# Patient Record
Sex: MALE | Race: WHITE | NOT HISPANIC OR LATINO | URBAN - METROPOLITAN AREA
[De-identification: names, ages, dates, MRNs, and addresses within clinical notes are randomized per-mention and may not be internally consistent; named-entity substitution may affect disease eponyms.]

---

## 2019-12-26 ENCOUNTER — OFFICE VISIT (OUTPATIENT)
Dept: URGENT CARE | Facility: CLINIC | Age: 29
End: 2019-12-26
Payer: COMMERCIAL

## 2019-12-26 VITALS
SYSTOLIC BLOOD PRESSURE: 124 MMHG | TEMPERATURE: 99 F | OXYGEN SATURATION: 96 % | RESPIRATION RATE: 12 BRPM | WEIGHT: 193 LBS | HEART RATE: 105 BPM | DIASTOLIC BLOOD PRESSURE: 71 MMHG

## 2019-12-26 DIAGNOSIS — H10.32 ACUTE BACTERIAL CONJUNCTIVITIS OF LEFT EYE: Primary | ICD-10-CM

## 2019-12-26 PROCEDURE — 99204 OFFICE O/P NEW MOD 45 MIN: CPT | Mod: S$GLB,,, | Performed by: NURSE PRACTITIONER

## 2019-12-26 PROCEDURE — 99204 PR OFFICE/OUTPT VISIT, NEW, LEVL IV, 45-59 MIN: ICD-10-PCS | Mod: S$GLB,,, | Performed by: NURSE PRACTITIONER

## 2019-12-26 RX ORDER — OFLOXACIN 3 MG/ML
1 SOLUTION/ DROPS OPHTHALMIC 4 TIMES DAILY
Qty: 10 ML | Refills: 0 | Status: SHIPPED | OUTPATIENT
Start: 2019-12-26

## 2019-12-26 RX ORDER — CETIRIZINE HYDROCHLORIDE 10 MG/1
10 TABLET ORAL DAILY
Qty: 90 TABLET | Refills: 0 | Status: SHIPPED | OUTPATIENT
Start: 2019-12-26

## 2019-12-26 NOTE — PROGRESS NOTES
Subjective:       Patient ID: Leroy Marquez is a 29 y.o. male.    Vitals:  weight is 87.5 kg (193 lb). His temperature is 98.7 °F (37.1 °C). His blood pressure is 124/71 and his pulse is 105. His respiration is 12 and oxygen saturation is 96%.     Chief Complaint: Eye Problem    Eye Problem    The left eye is affected. This is a new problem. Episode onset: x 4 days  The problem occurs constantly. The pain is at a severity of 0/10. The patient is experiencing no pain. He does not wear contacts. Associated symptoms include an eye discharge, eye redness and itching. Pertinent negatives include no blurred vision (only when draining), double vision, fever or photophobia. Associated symptoms comments: Left eye redness, crusted drainage, on/off pain and itchiness, light sensitivity . He has tried eye drops (OTC eye drops ) for the symptoms. The treatment provided no relief.       Constitution: Negative for chills, fatigue and fever.   HENT: Negative.    Eyes: Positive for eye discharge, eye itching, eye pain and eye redness. Negative for eye trauma, foreign body in eye, photophobia, vision loss, double vision, blurred vision (only when draining) and eyelid swelling.   Respiratory: Negative.    Neurological: Negative for dizziness, light-headedness and headaches.       Objective:      Physical Exam   Constitutional: He is oriented to person, place, and time. He appears well-developed and well-nourished.   HENT:   Head: Normocephalic and atraumatic.   Eyes: Pupils are equal, round, and reactive to light. EOM are normal. Right eye exhibits no discharge. Left eye exhibits discharge and exudate (ava colored). Left eye exhibits no hordeolum. No foreign body present in the left eye. No scleral icterus.   Redness noted to sclera of left eye and conjunctivae of left eye   Neurological: He is alert and oriented to person, place, and time.   Skin: Skin is warm and dry. Capillary refill takes less than 2 seconds.   Psychiatric:  He has a normal mood and affect. His behavior is normal. Thought content normal.         Assessment:       1. Acute bacterial conjunctivitis of left eye        Plan:         Acute bacterial conjunctivitis of left eye  -     ofloxacin (OCUFLOX) 0.3 % ophthalmic solution; Place 1 drop into the left eye 4 (four) times daily.  Dispense: 10 mL; Refill: 0  -     cetirizine (ZYRTEC) 10 MG tablet; Take 1 tablet (10 mg total) by mouth once daily.  Dispense: 90 tablet; Refill: 0    If no relief within first 48 hours follow up with ophthalmologist or PCP

## 2020-05-28 ENCOUNTER — OCCUPATIONAL HEALTH (OUTPATIENT)
Dept: URGENT CARE | Facility: CLINIC | Age: 30
End: 2020-05-28

## 2020-05-28 PROCEDURE — 80305 PR DRUG SCREEN - 1: ICD-10-PCS | Mod: S$GLB,,, | Performed by: EMERGENCY MEDICINE

## 2020-05-28 PROCEDURE — 80305 DRUG TEST PRSMV DIR OPT OBS: CPT | Mod: S$GLB,,, | Performed by: EMERGENCY MEDICINE
